# Patient Record
Sex: MALE
[De-identification: names, ages, dates, MRNs, and addresses within clinical notes are randomized per-mention and may not be internally consistent; named-entity substitution may affect disease eponyms.]

---

## 2020-03-02 ENCOUNTER — HOSPITAL ENCOUNTER (OUTPATIENT)
Dept: HOSPITAL 88 - OR | Age: 40
Discharge: HOME | End: 2020-03-02
Attending: INTERNAL MEDICINE
Payer: COMMERCIAL

## 2020-03-02 VITALS — DIASTOLIC BLOOD PRESSURE: 80 MMHG | SYSTOLIC BLOOD PRESSURE: 120 MMHG

## 2020-03-02 DIAGNOSIS — K29.50: Primary | ICD-10-CM

## 2020-03-02 DIAGNOSIS — Z01.810: ICD-10-CM

## 2020-03-02 DIAGNOSIS — K21.9: ICD-10-CM

## 2020-03-02 DIAGNOSIS — J45.909: ICD-10-CM

## 2020-03-02 DIAGNOSIS — E66.01: ICD-10-CM

## 2020-03-02 DIAGNOSIS — K29.80: ICD-10-CM

## 2020-03-02 PROCEDURE — 93005 ELECTROCARDIOGRAM TRACING: CPT

## 2020-03-02 PROCEDURE — 43239 EGD BIOPSY SINGLE/MULTIPLE: CPT

## 2020-03-02 NOTE — NUR
SPIRITUAL CARE - Pre-Surgery



Assessment:  Pt in bed. Pt's wife at bedside. Pt reported supportive attention from family 
and friends. 

Intervention: I provided pastoral presence, hospitality, and sympathetic listening.  I 
acquainted pt with availability of  while hospitalized.

Outcome: Pt expressed appreciation for visit. No need for follow up indicated at this time. 



FRANSISCO Travislain

Spiritual Care Department

O: [phone]

Pager: [phone])

## 2020-03-02 NOTE — OPERATIVE REPORT
DATE OF PROCEDURE:  03/02/2020

 

SURGEON:  Karri Julio MD

 

PROCEDURE:  EGD with biopsies.

 

INDICATIONS FOR EGD:  Upper abdominal pain, bloating.

 

MEDICATIONS:  The patient was done under MAC, please see anesthesiologist's note.

 

PROCEDURE IN DETAIL:  With the patient in left lateral decubitus position, a flexible

fiberoptic Olympus gastroscope was introduced into the esophagus under direct

visualization without any difficulty.  The esophagus appeared to be within normal

limits.  The scope was then advanced with ease into the stomach.  Mucosa overlying the

antrum and the body revealed some patchy erythema and low-grade edema and biopsies were

obtained, and sent to stain for H. pylori.  Pylorus was of normal contour and shape, it

was intubated with ease and the scope was advanced all the way to the second portion of

the duodenum.  Biopsies were obtained from the second portion and duodenal bulb to rule

out sprue.  The scope was then withdrawn back into the stomach and retroflexed, mucosa

overlying the fundus and cardia appeared to be within normal limits.  The scope was then

straightened out, it was subsequently withdrawn.  The patient tolerated the procedure

well. 

 

IMPRESSION:  

1. Normal esophagus.

2. Gastritis, biopsied, biopsies sent to stain for H. pylori.

3. Rule out sprue.

 

PLAN:  

1. Follow up histology.

2. Initiate Protonix 40 mg one p.o. q.a.m. a.c.

 

 

 

 

______________________________

Karri Julio MD

 

[facility]

D:  03/02/2020 08:06:58

T:  03/02/2020 14:47:13

Job #:  [id number]